# Patient Record
Sex: FEMALE | Race: WHITE | NOT HISPANIC OR LATINO | URBAN - METROPOLITAN AREA
[De-identification: names, ages, dates, MRNs, and addresses within clinical notes are randomized per-mention and may not be internally consistent; named-entity substitution may affect disease eponyms.]

---

## 2023-04-20 ENCOUNTER — APPOINTMENT (OUTPATIENT)
Dept: PRIMARY CARE | Facility: CLINIC | Age: 28
End: 2023-04-20
Payer: COMMERCIAL

## 2023-06-05 ENCOUNTER — LAB (OUTPATIENT)
Dept: LAB | Facility: LAB | Age: 28
End: 2023-06-05
Payer: COMMERCIAL

## 2023-06-05 ENCOUNTER — OFFICE VISIT (OUTPATIENT)
Dept: PRIMARY CARE | Facility: CLINIC | Age: 28
End: 2023-06-05
Payer: COMMERCIAL

## 2023-06-05 VITALS
SYSTOLIC BLOOD PRESSURE: 129 MMHG | HEIGHT: 63 IN | DIASTOLIC BLOOD PRESSURE: 71 MMHG | HEART RATE: 73 BPM | OXYGEN SATURATION: 97 % | BODY MASS INDEX: 29.38 KG/M2 | WEIGHT: 165.8 LBS

## 2023-06-05 DIAGNOSIS — E03.9 ACQUIRED HYPOTHYROIDISM: Primary | ICD-10-CM

## 2023-06-05 DIAGNOSIS — F32.0 CURRENT MILD EPISODE OF MAJOR DEPRESSIVE DISORDER, UNSPECIFIED WHETHER RECURRENT (CMS-HCC): ICD-10-CM

## 2023-06-05 DIAGNOSIS — R73.9 HYPERGLYCEMIA: ICD-10-CM

## 2023-06-05 DIAGNOSIS — I10 BENIGN ESSENTIAL HYPERTENSION: ICD-10-CM

## 2023-06-05 DIAGNOSIS — E03.9 ACQUIRED HYPOTHYROIDISM: ICD-10-CM

## 2023-06-05 LAB
ALANINE AMINOTRANSFERASE (SGPT) (U/L) IN SER/PLAS: 12 U/L (ref 7–45)
ALBUMIN (G/DL) IN SER/PLAS: 4.4 G/DL (ref 3.4–5)
ALBUMIN (MG/L) IN URINE: 34 MG/L
ALBUMIN/CREATININE (UG/MG) IN URINE: 85.2 UG/MG CRT (ref 0–30)
ALKALINE PHOSPHATASE (U/L) IN SER/PLAS: 36 U/L (ref 33–110)
ANION GAP IN SER/PLAS: 11 MMOL/L (ref 10–20)
ASPARTATE AMINOTRANSFERASE (SGOT) (U/L) IN SER/PLAS: 15 U/L (ref 9–39)
BASOPHILS (10*3/UL) IN BLOOD BY AUTOMATED COUNT: 0.12 X10E9/L (ref 0–0.1)
BASOPHILS/100 LEUKOCYTES IN BLOOD BY AUTOMATED COUNT: 1.9 % (ref 0–2)
BILIRUBIN TOTAL (MG/DL) IN SER/PLAS: 0.4 MG/DL (ref 0–1.2)
CALCIUM (MG/DL) IN SER/PLAS: 9.6 MG/DL (ref 8.6–10.6)
CARBON DIOXIDE, TOTAL (MMOL/L) IN SER/PLAS: 24 MMOL/L (ref 21–32)
CHLORIDE (MMOL/L) IN SER/PLAS: 106 MMOL/L (ref 98–107)
CHOLESTEROL (MG/DL) IN SER/PLAS: 158 MG/DL (ref 0–199)
CHOLESTEROL IN HDL (MG/DL) IN SER/PLAS: 48.1 MG/DL
CHOLESTEROL/HDL RATIO: 3.3
CREATININE (MG/DL) IN SER/PLAS: 0.57 MG/DL (ref 0.5–1.05)
CREATININE (MG/DL) IN URINE: 39.9 MG/DL (ref 20–320)
EOSINOPHILS (10*3/UL) IN BLOOD BY AUTOMATED COUNT: 0.17 X10E9/L (ref 0–0.7)
EOSINOPHILS/100 LEUKOCYTES IN BLOOD BY AUTOMATED COUNT: 2.7 % (ref 0–6)
ERYTHROCYTE DISTRIBUTION WIDTH (RATIO) BY AUTOMATED COUNT: 13.6 % (ref 11.5–14.5)
ERYTHROCYTE MEAN CORPUSCULAR HEMOGLOBIN CONCENTRATION (G/DL) BY AUTOMATED: 30.7 G/DL (ref 32–36)
ERYTHROCYTE MEAN CORPUSCULAR VOLUME (FL) BY AUTOMATED COUNT: 88 FL (ref 80–100)
ERYTHROCYTES (10*6/UL) IN BLOOD BY AUTOMATED COUNT: 4.25 X10E12/L (ref 4–5.2)
ESTIMATED AVERAGE GLUCOSE FOR HBA1C: 111 MG/DL
GFR FEMALE: >90 ML/MIN/1.73M2
GLUCOSE (MG/DL) IN SER/PLAS: 87 MG/DL (ref 74–99)
HEMATOCRIT (%) IN BLOOD BY AUTOMATED COUNT: 37.4 % (ref 36–46)
HEMOGLOBIN (G/DL) IN BLOOD: 11.5 G/DL (ref 12–16)
HEMOGLOBIN A1C/HEMOGLOBIN TOTAL IN BLOOD: 5.5 %
IMMATURE GRANULOCYTES/100 LEUKOCYTES IN BLOOD BY AUTOMATED COUNT: 0.3 % (ref 0–0.9)
LDL: 90 MG/DL (ref 0–99)
LEUKOCYTES (10*3/UL) IN BLOOD BY AUTOMATED COUNT: 6.4 X10E9/L (ref 4.4–11.3)
LYMPHOCYTES (10*3/UL) IN BLOOD BY AUTOMATED COUNT: 1.09 X10E9/L (ref 1.2–4.8)
LYMPHOCYTES/100 LEUKOCYTES IN BLOOD BY AUTOMATED COUNT: 17.1 % (ref 13–44)
MONOCYTES (10*3/UL) IN BLOOD BY AUTOMATED COUNT: 0.43 X10E9/L (ref 0.1–1)
MONOCYTES/100 LEUKOCYTES IN BLOOD BY AUTOMATED COUNT: 6.8 % (ref 2–10)
NEUTROPHILS (10*3/UL) IN BLOOD BY AUTOMATED COUNT: 4.54 X10E9/L (ref 1.2–7.7)
NEUTROPHILS/100 LEUKOCYTES IN BLOOD BY AUTOMATED COUNT: 71.2 % (ref 40–80)
NRBC (PER 100 WBCS) BY AUTOMATED COUNT: 0 /100 WBC (ref 0–0)
PLATELETS (10*3/UL) IN BLOOD AUTOMATED COUNT: 404 X10E9/L (ref 150–450)
POTASSIUM (MMOL/L) IN SER/PLAS: 4.3 MMOL/L (ref 3.5–5.3)
PROTEIN TOTAL: 7.5 G/DL (ref 6.4–8.2)
SODIUM (MMOL/L) IN SER/PLAS: 137 MMOL/L (ref 136–145)
THYROTROPIN (MIU/L) IN SER/PLAS BY DETECTION LIMIT <= 0.05 MIU/L: 0.87 MIU/L (ref 0.44–3.98)
TRIGLYCERIDE (MG/DL) IN SER/PLAS: 102 MG/DL (ref 0–149)
UREA NITROGEN (MG/DL) IN SER/PLAS: 8 MG/DL (ref 6–23)
VLDL: 20 MG/DL (ref 0–40)

## 2023-06-05 PROCEDURE — 80061 LIPID PANEL: CPT

## 2023-06-05 PROCEDURE — 99214 OFFICE O/P EST MOD 30 MIN: CPT | Performed by: INTERNAL MEDICINE

## 2023-06-05 PROCEDURE — 80053 COMPREHEN METABOLIC PANEL: CPT

## 2023-06-05 PROCEDURE — 1036F TOBACCO NON-USER: CPT | Performed by: INTERNAL MEDICINE

## 2023-06-05 PROCEDURE — 82043 UR ALBUMIN QUANTITATIVE: CPT

## 2023-06-05 PROCEDURE — 3074F SYST BP LT 130 MM HG: CPT | Performed by: INTERNAL MEDICINE

## 2023-06-05 PROCEDURE — 85025 COMPLETE CBC W/AUTO DIFF WBC: CPT

## 2023-06-05 PROCEDURE — 36415 COLL VENOUS BLD VENIPUNCTURE: CPT

## 2023-06-05 PROCEDURE — 82570 ASSAY OF URINE CREATININE: CPT

## 2023-06-05 PROCEDURE — 83036 HEMOGLOBIN GLYCOSYLATED A1C: CPT

## 2023-06-05 PROCEDURE — 3078F DIAST BP <80 MM HG: CPT | Performed by: INTERNAL MEDICINE

## 2023-06-05 PROCEDURE — 84443 ASSAY THYROID STIM HORMONE: CPT

## 2023-06-05 RX ORDER — METOPROLOL SUCCINATE 25 MG/1
25 TABLET, EXTENDED RELEASE ORAL DAILY
COMMUNITY
Start: 2023-01-16 | End: 2023-09-07 | Stop reason: SDUPTHER

## 2023-06-05 RX ORDER — ASPIRIN 325 MG
1 TABLET, DELAYED RELEASE (ENTERIC COATED) ORAL
COMMUNITY
Start: 2022-06-21

## 2023-06-05 RX ORDER — SERTRALINE HYDROCHLORIDE 50 MG/1
50 TABLET, FILM COATED ORAL 2 TIMES DAILY
COMMUNITY
Start: 2022-11-13 | End: 2023-09-07 | Stop reason: SDUPTHER

## 2023-06-05 RX ORDER — BUPROPION HYDROCHLORIDE 100 MG/1
100 TABLET ORAL 2 TIMES DAILY
COMMUNITY
Start: 2023-01-17 | End: 2023-09-07 | Stop reason: SDUPTHER

## 2023-06-05 RX ORDER — FLUTICASONE PROPIONATE 50 MCG
SPRAY, SUSPENSION (ML) NASAL
COMMUNITY
Start: 2022-05-10

## 2023-06-05 ASSESSMENT — PATIENT HEALTH QUESTIONNAIRE - PHQ9
SUM OF ALL RESPONSES TO PHQ9 QUESTIONS 1 AND 2: 0
1. LITTLE INTEREST OR PLEASURE IN DOING THINGS: NOT AT ALL
2. FEELING DOWN, DEPRESSED OR HOPELESS: NOT AT ALL

## 2023-06-05 NOTE — PROGRESS NOTES
Subjective   Patient ID: Vero Leiva is a 28 y.o. female who presents for Follow-up (3 mo follow up).    Assessment/Plan     Problem List Items Addressed This Visit          Circulatory    Benign essential hypertension     Patients BP readings reviewed and addressed, as we age our arteries turn stiffer and less elastic. Restricting salt consumption and staying physically fit with regular exercise regimen is the only way to keep our vasculature less tonic. Studies have shown that keeping ideal body wt, exercise routine about 140 to 150 minutes a week, eating variety of plant based diet and drinking plentiful water are quite helpful. Monitor BP twice or once a week at home and bring log to be reviewed by me. Uncontrolled BP has long term consequences including heart failure, myocardial infarction, accelerated atherosclerosis and kidney dysfunction. Therapy reviewed and explained.           Relevant Orders    Albumin , Urine Random    CBC and Auto Differential    Comprehensive Metabolic Panel    Hemoglobin A1C    Lipid Panel    TSH with reflex to Free T4 if abnormal       Endocrine/Metabolic    Acquired hypothyroidism - Primary     Check T3-T4 TSH         Relevant Orders    Albumin , Urine Random    CBC and Auto Differential    Comprehensive Metabolic Panel    Hemoglobin A1C    Lipid Panel    TSH with reflex to Free T4 if abnormal       Other    Current mild episode of major depressive disorder (CMS/HCC)     Depression is chronic and quite common and notorious mental health disorder and it is quite common and widespread, there are several therapeutics available for depression now a days. It is considered as chemical imbalance disorder and with medications , it can be adjusted. There are side effects from SSRI and SNRIs but on long term, they are well tolerated. Please do not feel awkward or shy to contact us if feel tired, sleepy, lack of energy or aloof/ alone. Untreated depression can bring serious consequences  including suicidal ideations, mental health counselling is available if need arises. Usually treatment of depression is long lasting therapy with periodic evaluations and follow ups. Pt with depression no longer have to feel frustrated, helpless or isolated.Detailed discussion was carried out.           Relevant Orders    Albumin , Urine Random    CBC and Auto Differential    Comprehensive Metabolic Panel    Hemoglobin A1C    Lipid Panel    TSH with reflex to Free T4 if abnormal    Hyperglycemia     Obesity with hyperglycemia endocrine work-up low-carb diet check hemoglobin A1c         Relevant Orders    Hemoglobin A1C   Patient was evaluated today, problem list was reviewed, problems and concerns addressed, Rx list reviewed and updated, lab and tests were noted and reviewed. Life style changes were discussed, always it works better if we eat plant based diet and plenty of fibres and roughage. Consume adequate amount of water and avoid alcohol, light to moderate physical activities and stress reduction are always beneficial for ongoing physical well being. Do not forget to have 6 to 7 hours of sleep regularly and avoid late night jose r screen exposure.      HPI 28-year-old patient have anxiety depression behavior problem slow learning cerebral palsy metabolic syndrome obesity hypertension hyperlipidemia hyperglycemia underlying thyroid dysfunction cannot be rule out advised endocrine work-up check the lipid hemoglobin A1c thyroid refer patient to dietitian and OB/GYN.  Negative for suicide negative for pregnancy.    No Known Allergies    Current Outpatient Medications   Medication Sig Dispense Refill    buPROPion (Wellbutrin) 100 mg tablet Take 1 tablet (100 mg) by mouth 2 times a day.      fluticasone (Flonase) 50 mcg/actuation nasal spray Administer into affected nostril(s) once daily.      metoprolol succinate XL (Toprol-XL) 25 mg 24 hr tablet Take 1 tablet (25 mg) by mouth once daily.      sertraline (Zoloft) 50  "mg tablet Take 1 tablet (50 mg) by mouth 2 times a day.      cholecalciferol (Vitamin D-3) 1,250 mcg (50,000 unit) capsule Take 1 capsule (50,000 Units) by mouth 1 (one) time per week.       No current facility-administered medications for this visit.       Objective   Visit Vitals  /71   Pulse 73   Ht 1.6 m (5' 3\")   Wt 75.2 kg (165 lb 12.8 oz)   SpO2 97%   BMI 29.37 kg/m²   Smoking Status Never   BSA 1.83 m²     Physical Exam  Constitutional:       General: He is not in acute distress.     Appearance: Normal appearance.  Obesity  HENT:      Head: Normocephalic and atraumatic.      Nose: Nose normal.   Eyes:      Extraocular Movements: Extraocular movements intact.      Conjunctiva/sclera: Conjunctivae normal.   Cardiovascular:      Rate and Rhythm: Normal rate and regular rhythm.   Pulmonary:      Effort: Pulmonary effort is normal.      Breath sounds: Normal breath sounds.  Rhonchi  Skin:     General: Skin is warm.   Neurological:      Mental Status: He is alert and oriented to person, place, and time.  Neuralgia  Psychiatric:         Mood and Affect: Mood normal.   Depression   Behavior: Behavior normal.   Immunization History   Administered Date(s) Administered    Moderna SARS-CoV-2 Vaccination 01/14/2021       Review of Systems    No visits with results within 4 Month(s) from this visit.   Latest known visit with results is:   Legacy Encounter on 12/02/2022   Component Date Value Ref Range Status    Vitamin D, 25-Hydroxy 12/02/2022 46  ng/mL Final    WBC 12/02/2022 6.1  4.4 - 11.3 x10E9/L Final    nRBC 12/02/2022 0.0  0.0 - 0.0 /100 WBC Final    RBC 12/02/2022 4.32  4.00 - 5.20 x10E12/L Final    Hemoglobin 12/02/2022 12.6  12.0 - 16.0 g/dL Final    Hematocrit 12/02/2022 38.7  36.0 - 46.0 % Final    MCV 12/02/2022 90  80 - 100 fL Final    MCHC 12/02/2022 32.6  32.0 - 36.0 g/dL Final    Platelets 12/02/2022 438  150 - 450 x10E9/L Final    RDW 12/02/2022 13.6  11.5 - 14.5 % Final    Neutrophils % 12/02/2022 " 63.4  40.0 - 80.0 % Final    Immature Granulocytes %, Automated 12/02/2022 0.2  0.0 - 0.9 % Final    Lymphocytes % 12/02/2022 23.8  13.0 - 44.0 % Final    Monocytes % 12/02/2022 8.2  2.0 - 10.0 % Final    Eosinophils % 12/02/2022 2.3  0.0 - 6.0 % Final    Basophils % 12/02/2022 2.1  0.0 - 2.0 % Final    Neutrophils Absolute 12/02/2022 3.86  1.20 - 7.70 x10E9/L Final    Lymphocytes Absolute 12/02/2022 1.45  1.20 - 4.80 x10E9/L Final    Monocytes Absolute 12/02/2022 0.50  0.10 - 1.00 x10E9/L Final    Eosinophils Absolute 12/02/2022 0.14  0.00 - 0.70 x10E9/L Final    Basophils Absolute 12/02/2022 0.13 (H)  0.00 - 0.10 x10E9/L Final       Radiology: Reviewed imaging in powerchart.  No results found.    No family history on file.  Social History     Socioeconomic History    Marital status: Single     Spouse name: None    Number of children: None    Years of education: None    Highest education level: None   Occupational History    None   Tobacco Use    Smoking status: Never    Smokeless tobacco: Never   Vaping Use    Vaping status: None   Substance and Sexual Activity    Alcohol use: Yes    Drug use: None    Sexual activity: None   Other Topics Concern    None   Social History Narrative    None     Social Determinants of Health     Financial Resource Strain: Not on file   Food Insecurity: Not on file   Transportation Needs: Not on file   Physical Activity: Not on file   Stress: Not on file   Social Connections: Not on file   Intimate Partner Violence: Not on file   Housing Stability: Not on file     Past Medical History:   Diagnosis Date    Acute bronchitis due to other specified organisms 12/13/2022    Acute bronchitis, bacterial    Acute upper respiratory infection, unspecified 12/02/2022    URTI (acute upper respiratory infection)    Personal history of diseases of the blood and blood-forming organs and certain disorders involving the immune mechanism 12/02/2022    History of anemia    Personal history of other  endocrine, nutritional and metabolic disease 12/02/2022    History of vitamin D deficiency    Personal history of urinary (tract) infections 06/21/2022    History of urinary tract infection     Past Surgical History:   Procedure Laterality Date    OTHER SURGICAL HISTORY  05/10/2022    No history of surgery       Charting was completed using voice recognition technology and may include unintended errors.

## 2023-06-08 ENCOUNTER — TELEPHONE (OUTPATIENT)
Dept: PRIMARY CARE | Facility: CLINIC | Age: 28
End: 2023-06-08
Payer: COMMERCIAL

## 2023-06-08 NOTE — TELEPHONE ENCOUNTER
----- Message from Romy Keita MD sent at 6/6/2023 10:01 AM EDT -----  Anemia advised Slow Fe 1 a day follow-up 3 months repeat CBC in 3 months

## 2023-09-07 ENCOUNTER — OFFICE VISIT (OUTPATIENT)
Dept: PRIMARY CARE | Facility: CLINIC | Age: 28
End: 2023-09-07
Payer: COMMERCIAL

## 2023-09-07 VITALS
OXYGEN SATURATION: 97 % | DIASTOLIC BLOOD PRESSURE: 83 MMHG | WEIGHT: 162 LBS | SYSTOLIC BLOOD PRESSURE: 117 MMHG | TEMPERATURE: 97.5 F | HEART RATE: 90 BPM | BODY MASS INDEX: 28.7 KG/M2 | HEIGHT: 63 IN

## 2023-09-07 DIAGNOSIS — R41.89 COGNITIVE AND BEHAVIORAL CHANGES: Primary | ICD-10-CM

## 2023-09-07 DIAGNOSIS — R73.9 HYPERGLYCEMIA: ICD-10-CM

## 2023-09-07 DIAGNOSIS — M19.90 ARTHRITIS: ICD-10-CM

## 2023-09-07 DIAGNOSIS — I10 BENIGN ESSENTIAL HYPERTENSION: ICD-10-CM

## 2023-09-07 DIAGNOSIS — F33.0 MILD EPISODE OF RECURRENT MAJOR DEPRESSIVE DISORDER (CMS-HCC): ICD-10-CM

## 2023-09-07 DIAGNOSIS — F90.1 ATTENTION DEFICIT HYPERACTIVITY DISORDER (ADHD), PREDOMINANTLY HYPERACTIVE TYPE: ICD-10-CM

## 2023-09-07 DIAGNOSIS — E03.9 ACQUIRED HYPOTHYROIDISM: ICD-10-CM

## 2023-09-07 DIAGNOSIS — R46.89 COGNITIVE AND BEHAVIORAL CHANGES: Primary | ICD-10-CM

## 2023-09-07 PROCEDURE — 3079F DIAST BP 80-89 MM HG: CPT | Performed by: INTERNAL MEDICINE

## 2023-09-07 PROCEDURE — 1036F TOBACCO NON-USER: CPT | Performed by: INTERNAL MEDICINE

## 2023-09-07 PROCEDURE — 3074F SYST BP LT 130 MM HG: CPT | Performed by: INTERNAL MEDICINE

## 2023-09-07 PROCEDURE — 99214 OFFICE O/P EST MOD 30 MIN: CPT | Performed by: INTERNAL MEDICINE

## 2023-09-07 RX ORDER — SERTRALINE HYDROCHLORIDE 50 MG/1
50 TABLET, FILM COATED ORAL 2 TIMES DAILY
Qty: 180 TABLET | Refills: 3 | Status: SHIPPED | OUTPATIENT
Start: 2023-09-07 | End: 2023-12-21 | Stop reason: SDUPTHER

## 2023-09-07 RX ORDER — BUPROPION HYDROCHLORIDE 100 MG/1
100 TABLET ORAL 2 TIMES DAILY
Qty: 180 TABLET | Refills: 3 | Status: SHIPPED | OUTPATIENT
Start: 2023-09-07 | End: 2023-12-21 | Stop reason: SDUPTHER

## 2023-09-07 RX ORDER — METOPROLOL SUCCINATE 25 MG/1
25 TABLET, EXTENDED RELEASE ORAL DAILY
Qty: 90 TABLET | Refills: 3 | Status: SHIPPED | OUTPATIENT
Start: 2023-09-07

## 2023-09-07 NOTE — PROGRESS NOTES
Subjective   Patient ID: Vero Leiva is a 28 y.o. female who presents for Follow-up (3 month ).    Assessment/Plan     Problem List Items Addressed This Visit       RESOLVED: Acquired hypothyroidism    Benign essential hypertension     Patients BP readings reviewed and addressed, as we age our arteries turn stiffer and less elastic. Restricting salt consumption and staying physically fit with regular exercise regimen is the only way to keep our vasculature less tonic. Studies have shown that keeping ideal body wt, exercise routine about 140 to 150 minutes a week, eating variety of plant based diet and drinking plentiful water are quite helpful. Monitor BP twice or once a week at home and bring log to be reviewed by me. Uncontrolled BP has long term consequences including heart failure, myocardial infarction, accelerated atherosclerosis and kidney dysfunction. Therapy reviewed and explained.           Relevant Medications    metoprolol succinate XL (Toprol-XL) 25 mg 24 hr tablet    Current mild episode of major depressive disorder (CMS/HCC)     Depression is chronic and quite common and notorious mental health disorder and it is quite common and widespread, there are several therapeutics available for depression now a days. It is considered as chemical imbalance disorder and with medications , it can be adjusted. There are side effects from SSRI and SNRIs but on long term, they are well tolerated. Please do not feel awkward or shy to contact us if feel tired, sleepy, lack of energy or aloof/ alone. Untreated depression can bring serious consequences including suicidal ideations, mental health counselling is available if need arises. Usually treatment of depression is long lasting therapy with periodic evaluations and follow ups. Pt with depression no longer have to feel frustrated, helpless or isolated.Detailed discussion was carried out.           Relevant Medications    buPROPion (Wellbutrin) 100 mg tablet     sertraline (Zoloft) 50 mg tablet    RESOLVED: Hyperglycemia    Attention deficit hyperactivity disorder (ADHD), predominantly hyperactive type    Cognitive and behavioral changes - Primary    Relevant Orders    Rheumatoid Factor    JAYSHREE without Reflex TRACY    C-Reactive Protein    Sedimentation Rate    Anti-DNA Antibody, Double-Stranded    Arthritis    Relevant Orders    Rheumatoid Factor    JAYSHREE without Reflex TRACY    C-Reactive Protein    Sedimentation Rate    Anti-DNA Antibody, Double-Stranded     Patient was evaluated today, problem list was reviewed, problems and concerns addressed, Rx list reviewed and updated, lab and tests were noted and reviewed. Life style changes were discussed, always it works better if we eat plant based diet and plenty of fibres and roughage. Consume adequate amount of water and avoid alcohol, light to moderate physical activities and stress reduction are always beneficial for ongoing physical well being. Do not forget to have 6 to 7 hours of sleep regularly and avoid late night jose r screen exposure.    HPI  Patient is a 28-year-old patient with anxiety depression ADD ADHD hypertension hyperlipidemia cognitive dysfunction with the behavioral changes    Advised to get ophthalmology dentist OB/GYN neuropsych evaluation    Arthritis affecting the multiple joint arthritis work-up    Review medication and laboratory    Patient qualified for the disability because of the underlying cognitive dysfunction with a behavioral problem  Past Medical History:   Diagnosis Date    Acute bronchitis due to other specified organisms 12/13/2022    Acute bronchitis, bacterial    Acute upper respiratory infection, unspecified 12/02/2022    URTI (acute upper respiratory infection)    Personal history of diseases of the blood and blood-forming organs and certain disorders involving the immune mechanism 12/02/2022    History of anemia    Personal history of other endocrine, nutritional and metabolic disease  12/02/2022    History of vitamin D deficiency    Personal history of urinary (tract) infections 06/21/2022    History of urinary tract infection     Past Surgical History:   Procedure Laterality Date    OTHER SURGICAL HISTORY  05/10/2022    No history of surgery     No Known Allergies  Current Outpatient Medications   Medication Sig Dispense Refill    cholecalciferol (Vitamin D-3) 1,250 mcg (50,000 unit) capsule Take 1 capsule (50,000 Units) by mouth 1 (one) time per week.      fluticasone (Flonase) 50 mcg/actuation nasal spray Administer into affected nostril(s) once daily.      buPROPion (Wellbutrin) 100 mg tablet Take 1 tablet (100 mg) by mouth 2 times a day. 180 tablet 3    metoprolol succinate XL (Toprol-XL) 25 mg 24 hr tablet Take 1 tablet (25 mg) by mouth once daily. 90 tablet 3    sertraline (Zoloft) 50 mg tablet Take 1 tablet (50 mg) by mouth 2 times a day. 180 tablet 3     No current facility-administered medications for this visit.     No family history on file.  Social History     Socioeconomic History    Marital status: Single     Spouse name: None    Number of children: None    Years of education: None    Highest education level: None   Occupational History    None   Tobacco Use    Smoking status: Never    Smokeless tobacco: Never   Substance and Sexual Activity    Alcohol use: Yes    Drug use: None    Sexual activity: None   Other Topics Concern    None   Social History Narrative    None     Social Determinants of Health     Financial Resource Strain: Not on file   Food Insecurity: Not on file   Transportation Needs: Not on file   Physical Activity: Not on file   Stress: Not on file   Social Connections: Not on file   Intimate Partner Violence: Not on file   Housing Stability: Not on file     Immunization History   Administered Date(s) Administered    Moderna SARS-CoV-2 Vaccination 01/14/2021       Review of Systems  Review of systems is otherwise negative unless stated above or in history of present  "illness.    Objective   Visit Vitals  /83 (BP Location: Left arm, Patient Position: Sitting, BP Cuff Size: Adult)   Pulse 90   Temp 36.4 °C (97.5 °F)   Ht 1.6 m (5' 3\")   Wt 73.5 kg (162 lb)   SpO2 97%   BMI 28.70 kg/m²   Smoking Status Never   BSA 1.81 m²     Physical Exam  Constitutional: Obesity     General: not in acute distress.   HENT:      Head: Normocephalic and atraumatic.      Nose: Nose normal.   Eyes: Eyeglasses     Extraocular Movements: Extraocular movements intact.      Conjunctiva/sclera: Conjunctivae normal.   Cardiovascular: Heart murmur     Rate and Rhythm: Normal rate ,  No M/R/G  Pulmonary:      Effort: Pulmonary effort is normal.      Breath sounds: Normal, Bilat Equal AE  Skin:     General: Skin is warm.   Neurological: ADD HD HD speech problem dysarthria dysphagia     Mental Status: He is alert and oriented to person, place, and time.   Psychiatric:    Anxiety depression poor concentration behavior problem     Mood and Affect: Mood normal.         Behavior: Behavior normal.   Musculoskeletal arthritis of knees spine and hip  FROM in all extremitirs,  Joint-no swelling or tenderness    Lab on 06/05/2023   Component Date Value Ref Range Status    ALBUMIN (MG/L) IN URINE 06/05/2023 34.0  Not Established mg/L Final    Albumin/Creatine Ratio 06/05/2023 85.2 (H)  0.0 - 30.0 ug/mg crt Final    Creatinine, Urine 06/05/2023 39.9  20.0 - 320.0 mg/dL Final    WBC 06/05/2023 6.4  4.4 - 11.3 x10E9/L Final    nRBC 06/05/2023 0.0  0.0 - 0.0 /100 WBC Final    RBC 06/05/2023 4.25  4.00 - 5.20 x10E12/L Final    Hemoglobin 06/05/2023 11.5 (L)  12.0 - 16.0 g/dL Final    Hematocrit 06/05/2023 37.4  36.0 - 46.0 % Final    MCV 06/05/2023 88  80 - 100 fL Final    MCHC 06/05/2023 30.7 (L)  32.0 - 36.0 g/dL Final    Platelets 06/05/2023 404  150 - 450 x10E9/L Final    RDW 06/05/2023 13.6  11.5 - 14.5 % Final    Neutrophils % 06/05/2023 71.2  40.0 - 80.0 % Final    Immature Granulocytes %, Automated 06/05/2023 " 0.3  0.0 - 0.9 % Final    Lymphocytes % 06/05/2023 17.1  13.0 - 44.0 % Final    Monocytes % 06/05/2023 6.8  2.0 - 10.0 % Final    Eosinophils % 06/05/2023 2.7  0.0 - 6.0 % Final    Basophils % 06/05/2023 1.9  0.0 - 2.0 % Final    Neutrophils Absolute 06/05/2023 4.54  1.20 - 7.70 x10E9/L Final    Lymphocytes Absolute 06/05/2023 1.09 (L)  1.20 - 4.80 x10E9/L Final    Monocytes Absolute 06/05/2023 0.43  0.10 - 1.00 x10E9/L Final    Eosinophils Absolute 06/05/2023 0.17  0.00 - 0.70 x10E9/L Final    Basophils Absolute 06/05/2023 0.12 (H)  0.00 - 0.10 x10E9/L Final    Glucose 06/05/2023 87  74 - 99 mg/dL Final    Sodium 06/05/2023 137  136 - 145 mmol/L Final    Potassium 06/05/2023 4.3  3.5 - 5.3 mmol/L Final    Chloride 06/05/2023 106  98 - 107 mmol/L Final    Bicarbonate 06/05/2023 24  21 - 32 mmol/L Final    Anion Gap 06/05/2023 11  10 - 20 mmol/L Final    Urea Nitrogen 06/05/2023 8  6 - 23 mg/dL Final    Creatinine 06/05/2023 0.57  0.50 - 1.05 mg/dL Final    GFR Female 06/05/2023 >90  >90 mL/min/1.73m2 Final    Calcium 06/05/2023 9.6  8.6 - 10.6 mg/dL Final    Albumin 06/05/2023 4.4  3.4 - 5.0 g/dL Final    Alkaline Phosphatase 06/05/2023 36  33 - 110 U/L Final    Total Protein 06/05/2023 7.5  6.4 - 8.2 g/dL Final    AST 06/05/2023 15  9 - 39 U/L Final    Total Bilirubin 06/05/2023 0.4  0.0 - 1.2 mg/dL Final    ALT (SGPT) 06/05/2023 12  7 - 45 U/L Final    Hemoglobin A1C 06/05/2023 5.5  % Final    Estimated Average Glucose 06/05/2023 111  MG/DL Final    Cholesterol 06/05/2023 158  0 - 199 mg/dL Final    HDL 06/05/2023 48.1  mg/dL Final    Cholesterol/HDL Ratio 06/05/2023 3.3   Final    LDL 06/05/2023 90  0 - 99 mg/dL Final    VLDL 06/05/2023 20  0 - 40 mg/dL Final    Triglycerides 06/05/2023 102  0 - 149 mg/dL Final    TSH 06/05/2023 0.87  0.44 - 3.98 mIU/L Final       Radiology: Reviewed imaging in powerchart.  No results found.      Charting was completed using voice recognition technology and may include unintended  errors.

## 2023-10-07 ENCOUNTER — OFFICE VISIT (OUTPATIENT)
Dept: URGENT CARE | Facility: CLINIC | Age: 28
End: 2023-10-07
Payer: COMMERCIAL

## 2023-10-07 VITALS
HEART RATE: 77 BPM | OXYGEN SATURATION: 99 % | TEMPERATURE: 98.7 F | WEIGHT: 150 LBS | RESPIRATION RATE: 16 BRPM | DIASTOLIC BLOOD PRESSURE: 70 MMHG | SYSTOLIC BLOOD PRESSURE: 112 MMHG | BODY MASS INDEX: 26.57 KG/M2

## 2023-10-07 DIAGNOSIS — J01.90 ACUTE SINUSITIS, RECURRENCE NOT SPECIFIED, UNSPECIFIED LOCATION: Primary | ICD-10-CM

## 2023-10-07 PROCEDURE — 1036F TOBACCO NON-USER: CPT | Performed by: PHYSICIAN ASSISTANT

## 2023-10-07 PROCEDURE — 3074F SYST BP LT 130 MM HG: CPT | Performed by: PHYSICIAN ASSISTANT

## 2023-10-07 PROCEDURE — 99203 OFFICE O/P NEW LOW 30 MIN: CPT | Performed by: PHYSICIAN ASSISTANT

## 2023-10-07 PROCEDURE — 3078F DIAST BP <80 MM HG: CPT | Performed by: PHYSICIAN ASSISTANT

## 2023-10-07 RX ORDER — AMOXICILLIN AND CLAVULANATE POTASSIUM 875; 125 MG/1; MG/1
1 TABLET, FILM COATED ORAL 2 TIMES DAILY
Qty: 20 TABLET | Refills: 0 | Status: SHIPPED | OUTPATIENT
Start: 2023-10-07 | End: 2023-10-17

## 2023-10-07 RX ORDER — BENZONATATE 100 MG/1
100 CAPSULE ORAL 3 TIMES DAILY PRN
Qty: 30 CAPSULE | Refills: 0 | Status: SHIPPED | OUTPATIENT
Start: 2023-10-07 | End: 2024-10-06

## 2023-10-07 ASSESSMENT — ENCOUNTER SYMPTOMS
COUGH: 1
SINUS PRESSURE: 1

## 2023-10-07 ASSESSMENT — PAIN SCALES - GENERAL: PAINLEVEL: 0-NO PAIN

## 2023-10-07 NOTE — PROGRESS NOTES
Subjective   Patient ID: Vero Leiva is a 28 y.o. female.    Dragon inop.  Patient complains of congestion, sinus pressure and cough for the past one week.          Cough        The following portions of the chart were reviewed this encounter and updated as appropriate:         Review of Systems   HENT:  Positive for congestion and sinus pressure.    Respiratory:  Positive for cough.    All other systems reviewed and are negative.    Objective   Physical Exam  Vitals and nursing note reviewed.   Constitutional:       Appearance: Normal appearance. She is normal weight.   HENT:      Head: Normocephalic.      Right Ear: Tympanic membrane, ear canal and external ear normal.      Left Ear: Tympanic membrane, ear canal and external ear normal.      Nose: Nose normal.      Mouth/Throat:      Mouth: Mucous membranes are moist.      Pharynx: Oropharynx is clear.   Eyes:      Extraocular Movements: Extraocular movements intact.      Conjunctiva/sclera: Conjunctivae normal.      Pupils: Pupils are equal, round, and reactive to light.   Cardiovascular:      Rate and Rhythm: Normal rate and regular rhythm.      Pulses: Normal pulses.      Heart sounds: Normal heart sounds.   Pulmonary:      Effort: Pulmonary effort is normal. No respiratory distress.      Breath sounds: Normal breath sounds. No stridor. No wheezing, rhonchi or rales.   Chest:      Chest wall: No tenderness.   Musculoskeletal:      Cervical back: Normal range of motion and neck supple.   Skin:     General: Skin is warm.   Neurological:      General: No focal deficit present.      Mental Status: She is alert and oriented to person, place, and time.   Psychiatric:         Mood and Affect: Mood normal.         Behavior: Behavior normal.         Thought Content: Thought content normal.         Judgment: Judgment normal.       Procedures    Assessment/Plan   Marlene inop.  Patient was provided with prescriptions for Augmentin 875-125 mg and Tessalon 100  mg.    CLINICAL IMPRESSION:  Acute Sinusitis

## 2023-12-21 DIAGNOSIS — F33.0 MILD EPISODE OF RECURRENT MAJOR DEPRESSIVE DISORDER (CMS-HCC): ICD-10-CM

## 2023-12-21 RX ORDER — SERTRALINE HYDROCHLORIDE 50 MG/1
50 TABLET, FILM COATED ORAL 2 TIMES DAILY
Qty: 180 TABLET | Refills: 0 | Status: SHIPPED | OUTPATIENT
Start: 2023-12-21

## 2023-12-21 RX ORDER — BUPROPION HYDROCHLORIDE 100 MG/1
100 TABLET ORAL 2 TIMES DAILY
Qty: 180 TABLET | Refills: 0 | Status: SHIPPED | OUTPATIENT
Start: 2023-12-21